# Patient Record
Sex: FEMALE | Race: WHITE | NOT HISPANIC OR LATINO | URBAN - METROPOLITAN AREA
[De-identification: names, ages, dates, MRNs, and addresses within clinical notes are randomized per-mention and may not be internally consistent; named-entity substitution may affect disease eponyms.]

---

## 2019-06-21 ENCOUNTER — EMERGENCY (EMERGENCY)
Facility: HOSPITAL | Age: 29
LOS: 1 days | Discharge: HOME | End: 2019-06-21
Admitting: EMERGENCY MEDICINE
Payer: MEDICAID

## 2019-06-21 VITALS
DIASTOLIC BLOOD PRESSURE: 77 MMHG | TEMPERATURE: 98 F | SYSTOLIC BLOOD PRESSURE: 108 MMHG | OXYGEN SATURATION: 98 % | HEART RATE: 99 BPM | RESPIRATION RATE: 15 BRPM

## 2019-06-21 VITALS
SYSTOLIC BLOOD PRESSURE: 110 MMHG | DIASTOLIC BLOOD PRESSURE: 74 MMHG | OXYGEN SATURATION: 96 % | RESPIRATION RATE: 16 BRPM | HEART RATE: 85 BPM | TEMPERATURE: 98 F

## 2019-06-21 DIAGNOSIS — F10.120 ALCOHOL ABUSE WITH INTOXICATION, UNCOMPLICATED: ICD-10-CM

## 2019-06-21 DIAGNOSIS — R41.82 ALTERED MENTAL STATUS, UNSPECIFIED: ICD-10-CM

## 2019-06-21 PROCEDURE — 99053 MED SERV 10PM-8AM 24 HR FAC: CPT

## 2019-06-21 PROCEDURE — 99284 EMERGENCY DEPT VISIT MOD MDM: CPT | Mod: 25
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
## 2019-06-21 NOTE — ED ADULT NURSE NOTE - NS ED NURSE LEVEL OF CONSCIOUSNESS ORIENTATION
Please notify patient that her bone density test shows:  (1) normal strength of her lumbar spine (low back)  (2) slightly decreased bone strength of her hip compared to her previous bone density test, she does not have osteoporosis, no medications are necessary  (3) continue weightbearing exercise and activity, continue to take vitamin D daily  (4) repeat bone density in 2 years  
Oriented - self; Oriented - place; Oriented - time
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
## 2019-06-21 NOTE — ED PROVIDER NOTE - PHYSICAL EXAMINATION
General: lethargic, arousable to touch, smells of alcohol  Head: NCAT  Eyes: PERRL  EARS: tympanic membranes clear bilaterally, No redness, normal landmarks and light reflexes, canal clear without cerumen impaction, no HT bilaterally   Heart: RRR  Lungs: CTAB  Abd: soft, NTND  Neuro: moves all 4 extremities equally  Skin: no e/o lacerations, abrasions, or ecchymoses

## 2020-03-03 NOTE — ED ADULT TRIAGE NOTE - ESI TRIAGE ACUITY LEVEL, MLM
Massage Therapy Progress Note      Length of treatment: 60-minute    Authorization: Patient request    Reviewed contraindications/current health status with Patient    No Contraindications to massage therapy exist    Subjective:  Patient complains of:  Stiffness/tension in  Neck, Shoulders and Upper back today was to focus on this and this only including her face and head for the hour    Pain report prior to treatment:  0/10    Type of treatment requested: Wellness massage    Specific requests:  Neck/shoulder/upper back, with her side lying to start. Ended her in supine    Objective Observations:  Hypertonicity noted in:  CervicalThoracic paraspinals Bilateral and Lats, serratus anterior on the left    Hypotonicity/Ischemia noted in: None noted    Decreased ROM noted in No areas    Additional observations:  None    Assessment:  Patient received Swedish techniques to affected tissues.     Additional treatment provided: None at this time    Response to treatment: Patient gave positive verbal feedback    Pain report after treatment:0/10    Education/Resources: None provided this visit    Plan/Recommendations:  Session concluded
3